# Patient Record
Sex: FEMALE | URBAN - METROPOLITAN AREA
[De-identification: names, ages, dates, MRNs, and addresses within clinical notes are randomized per-mention and may not be internally consistent; named-entity substitution may affect disease eponyms.]

---

## 2018-06-05 ENCOUNTER — OFFICE VISIT (OUTPATIENT)
Dept: URBAN - METROPOLITAN AREA CLINIC 58 | Facility: CLINIC | Age: 78
End: 2018-06-05
Payer: MEDICARE

## 2018-06-05 DIAGNOSIS — H01.114 ALLERGIC DERMATITIS OF LEFT UPPER EYELID: ICD-10-CM

## 2018-06-05 DIAGNOSIS — H01.111 ALLERGIC DERMATITIS OF RIGHT UPPER EYELID: Primary | ICD-10-CM

## 2018-06-05 DIAGNOSIS — H25.13 AGE-RELATED NUCLEAR CATARACT, BILATERAL: ICD-10-CM

## 2018-06-05 PROCEDURE — 99213 OFFICE O/P EST LOW 20 MIN: CPT | Performed by: OPTOMETRIST

## 2018-06-05 ASSESSMENT — INTRAOCULAR PRESSURE
OD: 19
OS: 19

## 2018-06-05 NOTE — IMPRESSION/PLAN
Impression: Age-related nuclear cataract, bilateral: H25.13. Plan: Diagnosis discussed in detail. Pt desires cataract surgery. Consultation with Maria Isabel Landa DO for eval/treatment.

## 2019-07-18 ENCOUNTER — OFFICE VISIT (OUTPATIENT)
Dept: URBAN - METROPOLITAN AREA CLINIC 58 | Facility: CLINIC | Age: 79
End: 2019-07-18
Payer: MEDICARE

## 2019-07-18 DIAGNOSIS — H35.371 PUCKERING OF MACULA, RIGHT EYE: ICD-10-CM

## 2019-07-18 DIAGNOSIS — H40.013 OPEN ANGLE WITH BORDERLINE FINDINGS, LOW RISK, BILATERAL: ICD-10-CM

## 2019-07-18 DIAGNOSIS — H52.4 PRESBYOPIA: ICD-10-CM

## 2019-07-18 DIAGNOSIS — E11.9 TYPE 2 DIABETES MELLITUS W/O COMPLICATION: Primary | ICD-10-CM

## 2019-07-18 PROCEDURE — 92014 COMPRE OPH EXAM EST PT 1/>: CPT | Performed by: OPTOMETRIST

## 2019-07-18 ASSESSMENT — VISUAL ACUITY
OD: 20/30
OS: 20/30

## 2019-07-18 ASSESSMENT — INTRAOCULAR PRESSURE
OD: 20
OS: 20

## 2019-07-18 ASSESSMENT — KERATOMETRY
OS: 45.50
OD: 46.00

## 2019-07-18 NOTE — IMPRESSION/PLAN
Impression: Type 2 diabetes mellitus w/o complication: J74.3. Plan: Diabetes type II: no background retinopathy, no signs of neovascularization noted. Discussed ocular and systemic benefits of blood sugar control.

## 2019-07-18 NOTE — IMPRESSION/PLAN
Impression: Age-related nuclear cataract, bilateral: H25.13. Plan: Discussed diagnosis in detail with patient. No treatment is required at this time. Will continue to observe condition and or symptoms. Call if [address] worsens.

## 2019-07-18 NOTE — IMPRESSION/PLAN
Impression: Puckering of macula, right eye: H35.371. Plan: Discussed diagnosis in detail with patient. No treatment is required at this time. Will continue to observe condition and or symptoms. Call if [place] worsens.

## 2019-08-06 ENCOUNTER — TESTING ONLY (OUTPATIENT)
Dept: URBAN - METROPOLITAN AREA CLINIC 58 | Facility: CLINIC | Age: 79
End: 2019-08-06

## 2019-08-06 PROCEDURE — V2799 MISC VISION ITEM OR SERVICE: HCPCS | Performed by: OPTOMETRIST

## 2019-08-06 ASSESSMENT — KERATOMETRY
OD: 46.00
OS: 45.63

## 2019-08-06 ASSESSMENT — VISUAL ACUITY
OS: 20/30
OD: 20/30

## 2019-08-06 NOTE — IMPRESSION/PLAN
Impression: Presbyopia: H52.4. Plan: Diagnosis discussed. Same prescription found today. Best corrected vision only 20/30. Attribute problems to cataracts. See previous note. Continue to monitor.

## 2019-09-23 ENCOUNTER — OFFICE VISIT (OUTPATIENT)
Dept: URBAN - METROPOLITAN AREA CLINIC 58 | Facility: CLINIC | Age: 79
End: 2019-09-23
Payer: MEDICARE

## 2019-09-23 PROCEDURE — 99204 OFFICE O/P NEW MOD 45 MIN: CPT | Performed by: OPHTHALMOLOGY

## 2019-09-23 RX ORDER — PREDNISOLONE ACETATE 10 MG/ML
1 % SUSPENSION/ DROPS OPHTHALMIC
Qty: 5 | Refills: 1 | Status: INACTIVE
Start: 2019-09-23 | End: 2019-11-06

## 2019-09-23 RX ORDER — OFLOXACIN 3 MG/ML
0.3 % SOLUTION/ DROPS OPHTHALMIC
Qty: 5 | Refills: 1 | Status: INACTIVE
Start: 2019-09-23 | End: 2019-12-04

## 2019-09-23 ASSESSMENT — VISUAL ACUITY
OS: 20/30
OD: 20/30

## 2019-09-23 ASSESSMENT — INTRAOCULAR PRESSURE
OD: 18
OS: 18

## 2019-09-23 ASSESSMENT — KERATOMETRY
OS: 45.88
OD: 46.00

## 2019-09-26 ENCOUNTER — OFFICE VISIT (OUTPATIENT)
Dept: URBAN - METROPOLITAN AREA CLINIC 58 | Facility: CLINIC | Age: 79
End: 2019-09-26
Payer: MEDICARE

## 2019-09-26 PROCEDURE — W9997 IOL SELECTION: HCPCS | Performed by: OPHTHALMOLOGY

## 2019-09-26 PROCEDURE — 92136 OPHTHALMIC BIOMETRY: CPT | Performed by: OPHTHALMOLOGY

## 2019-09-26 ASSESSMENT — PACHYMETRY
OS: 2.77
OD: 2.88
OS: 22.35
OD: 22.29

## 2019-10-10 ENCOUNTER — OFFICE VISIT (OUTPATIENT)
Dept: URBAN - METROPOLITAN AREA CLINIC 58 | Facility: CLINIC | Age: 79
End: 2019-10-10
Payer: MEDICARE

## 2019-10-10 DIAGNOSIS — H00.025 HORDEOLUM INTERNUM LEFT LOWER EYELID: Primary | ICD-10-CM

## 2019-10-10 PROCEDURE — 99213 OFFICE O/P EST LOW 20 MIN: CPT | Performed by: OPTOMETRIST

## 2019-10-10 RX ORDER — CEPHALEXIN 500 MG/1
500 MG CAPSULE ORAL
Qty: 14 | Refills: 0 | Status: INACTIVE
Start: 2019-10-10 | End: 2019-11-06

## 2019-10-10 ASSESSMENT — INTRAOCULAR PRESSURE
OS: 20
OD: 19

## 2019-10-10 NOTE — IMPRESSION/PLAN
Impression: Hordeolum internum left lower eyelid: H00.025. Plan: Discussed diagnosis in detail with patient. Continue hot compresses tid OS until gone. Pt anxious and wants to make sure it goes away. Start Keflex 500mg PO bid x 1 week. OK to proceed with cat sx as scheduled. Expressed successfully in office.

## 2019-10-22 ENCOUNTER — SURGERY (OUTPATIENT)
Dept: URBAN - METROPOLITAN AREA SURGERY 32 | Facility: SURGERY | Age: 79
End: 2019-10-22
Payer: MEDICARE

## 2019-10-22 PROCEDURE — 66984 XCAPSL CTRC RMVL W/O ECP: CPT | Performed by: OPHTHALMOLOGY

## 2019-10-23 ENCOUNTER — POST-OPERATIVE VISIT (OUTPATIENT)
Dept: URBAN - METROPOLITAN AREA CLINIC 58 | Facility: CLINIC | Age: 79
End: 2019-10-23

## 2019-10-23 PROCEDURE — 99024 POSTOP FOLLOW-UP VISIT: CPT | Performed by: OPTOMETRIST

## 2019-10-23 ASSESSMENT — INTRAOCULAR PRESSURE
OS: 20
OD: 16

## 2019-10-31 ENCOUNTER — POST-OPERATIVE VISIT (OUTPATIENT)
Dept: URBAN - METROPOLITAN AREA CLINIC 58 | Facility: CLINIC | Age: 79
End: 2019-10-31

## 2019-10-31 DIAGNOSIS — Z09 ENCNTR FOR F/U EXAM AFT TRTMT FOR COND OTH THAN MALIG NEOPLM: Primary | ICD-10-CM

## 2019-10-31 PROCEDURE — 99024 POSTOP FOLLOW-UP VISIT: CPT | Performed by: OPTOMETRIST

## 2019-10-31 ASSESSMENT — INTRAOCULAR PRESSURE
OD: 16
OS: 15

## 2019-11-05 ENCOUNTER — SURGERY (OUTPATIENT)
Dept: URBAN - METROPOLITAN AREA SURGERY 32 | Facility: SURGERY | Age: 79
End: 2019-11-05
Payer: MEDICARE

## 2019-11-05 PROCEDURE — 66984 XCAPSL CTRC RMVL W/O ECP: CPT | Performed by: OPHTHALMOLOGY

## 2019-11-06 ENCOUNTER — POST-OPERATIVE VISIT (OUTPATIENT)
Dept: URBAN - METROPOLITAN AREA CLINIC 58 | Facility: CLINIC | Age: 79
End: 2019-11-06

## 2019-11-06 PROCEDURE — 99024 POSTOP FOLLOW-UP VISIT: CPT | Performed by: OPTOMETRIST

## 2019-11-06 ASSESSMENT — INTRAOCULAR PRESSURE
OD: 18
OS: 17

## 2019-11-11 ENCOUNTER — POST-OPERATIVE VISIT (OUTPATIENT)
Dept: URBAN - METROPOLITAN AREA CLINIC 58 | Facility: CLINIC | Age: 79
End: 2019-11-11

## 2019-11-11 PROCEDURE — 99024 POSTOP FOLLOW-UP VISIT: CPT | Performed by: OPTOMETRIST

## 2019-11-11 ASSESSMENT — INTRAOCULAR PRESSURE
OS: 14
OD: 14

## 2019-12-04 ENCOUNTER — POST-OPERATIVE VISIT (OUTPATIENT)
Dept: URBAN - METROPOLITAN AREA CLINIC 58 | Facility: CLINIC | Age: 79
End: 2019-12-04
Payer: COMMERCIAL

## 2019-12-04 PROCEDURE — 99024 POSTOP FOLLOW-UP VISIT: CPT | Performed by: OPTOMETRIST

## 2019-12-04 ASSESSMENT — VISUAL ACUITY
OS: 20/20-1
OD: 20/20

## 2019-12-04 ASSESSMENT — KERATOMETRY
OS: 45.38
OD: 45.75

## 2019-12-04 ASSESSMENT — INTRAOCULAR PRESSURE
OS: 12
OD: 12

## 2020-04-23 ENCOUNTER — OFFICE VISIT (OUTPATIENT)
Dept: URBAN - METROPOLITAN AREA CLINIC 58 | Facility: CLINIC | Age: 80
End: 2020-04-23
Payer: MEDICARE

## 2020-04-23 DIAGNOSIS — H10.45 OTHER CHRONIC ALLERGIC CONJUNCTIVITIS: Primary | ICD-10-CM

## 2020-04-23 PROCEDURE — 92014 COMPRE OPH EXAM EST PT 1/>: CPT | Performed by: OPTOMETRIST

## 2020-04-23 RX ORDER — PREDNISOLONE ACETATE 10 MG/ML
1 % SUSPENSION/ DROPS OPHTHALMIC
Qty: 5 | Refills: 0 | Status: INACTIVE
Start: 2020-04-23 | End: 2020-06-18

## 2020-04-23 ASSESSMENT — INTRAOCULAR PRESSURE
OS: 14
OD: 14

## 2020-04-23 NOTE — IMPRESSION/PLAN
Impression: Other chronic allergic conjunctivitis: H10.45. Plan: Diagnosis explained in detail, start prednisolone ac. 1% 1gtt OU tid x 1 wk then bid x 1 wk then qd x 1 wk then d/c. Will continue to monitor the condition. Pt to call if condition worsens. Smear excess onto lids.

## 2020-06-18 ENCOUNTER — OFFICE VISIT (OUTPATIENT)
Dept: URBAN - METROPOLITAN AREA CLINIC 58 | Facility: CLINIC | Age: 80
End: 2020-06-18
Payer: MEDICARE

## 2020-06-18 PROCEDURE — 92012 INTRM OPH EXAM EST PATIENT: CPT | Performed by: OPTOMETRIST

## 2020-06-18 RX ORDER — DOXYCYCLINE HYCLATE 50 MG/1
50 MG CAPSULE, GELATIN COATED ORAL
Qty: 60 | Refills: 0 | Status: INACTIVE
Start: 2020-06-18 | End: 2020-08-19

## 2020-06-18 ASSESSMENT — INTRAOCULAR PRESSURE: OS: 11

## 2020-06-18 NOTE — IMPRESSION/PLAN
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 Plan: Diagnosis discussed, explained that symptoms are caused by MGD, Doxycyline 50mg po x 60 days.   doxy instructions and side effects discussed

## 2020-08-19 ENCOUNTER — OFFICE VISIT (OUTPATIENT)
Dept: URBAN - METROPOLITAN AREA CLINIC 58 | Facility: CLINIC | Age: 80
End: 2020-08-19
Payer: MEDICARE

## 2020-08-19 PROCEDURE — 92012 INTRM OPH EXAM EST PATIENT: CPT | Performed by: OPTOMETRIST

## 2020-08-19 RX ORDER — DOXYCYCLINE HYCLATE 20 MG/1
20 MG TABLET, FILM COATED ORAL
Qty: 90 | Refills: 0 | Status: INACTIVE
Start: 2020-08-19 | End: 2020-09-08

## 2020-08-19 ASSESSMENT — INTRAOCULAR PRESSURE
OD: 14
OS: 12

## 2020-08-19 NOTE — IMPRESSION/PLAN
Impression: Rosacea conjunctivitis, bilateral: I17.560. Plan: Diagnosis discussed, explained that symptoms are caused by MGD. Continue Doxycyline 20mg po x 90 days. doxy instructions and side effects discussed. Will continue to monitor. Call if worse.

## 2020-10-22 ENCOUNTER — OFFICE VISIT (OUTPATIENT)
Dept: URBAN - METROPOLITAN AREA CLINIC 58 | Facility: CLINIC | Age: 80
End: 2020-10-22
Payer: MEDICARE

## 2020-10-22 PROCEDURE — 92012 INTRM OPH EXAM EST PATIENT: CPT | Performed by: OPTOMETRIST

## 2020-10-22 RX ORDER — PREDNISOLONE ACETATE 10 MG/ML
1 % SUSPENSION/ DROPS OPHTHALMIC
Qty: 5 | Refills: 0 | Status: INACTIVE
Start: 2020-10-22 | End: 2020-11-13

## 2020-10-22 ASSESSMENT — INTRAOCULAR PRESSURE
OD: 18
OS: 15

## 2020-10-22 NOTE — IMPRESSION/PLAN
Impression: Rosacea conjunctivitis, bilateral: O84.602. Plan: Diagnosis discussed in detail. Start Pred 1 gtt OS QID x 2 days then BID x 1 week.

## 2020-10-30 ENCOUNTER — OFFICE VISIT (OUTPATIENT)
Dept: URBAN - METROPOLITAN AREA CLINIC 58 | Facility: CLINIC | Age: 80
End: 2020-10-30
Payer: MEDICARE

## 2020-10-30 PROCEDURE — 92012 INTRM OPH EXAM EST PATIENT: CPT | Performed by: OPTOMETRIST

## 2020-10-30 ASSESSMENT — INTRAOCULAR PRESSURE
OD: 16
OS: 16

## 2020-10-30 NOTE — IMPRESSION/PLAN
Impression: Rosacea conjunctivitis, bilateral: L45.121. Plan: Diagnosis discussed in detail. Pred 1gtt OU qd x 1-2 wks then d/c.

## 2020-11-19 ENCOUNTER — OFFICE VISIT (OUTPATIENT)
Dept: URBAN - METROPOLITAN AREA CLINIC 58 | Facility: CLINIC | Age: 80
End: 2020-11-19
Payer: COMMERCIAL

## 2020-11-19 DIAGNOSIS — H10.823 ROSACEA CONJUNCTIVITIS, BILATERAL: ICD-10-CM

## 2020-11-19 PROCEDURE — 92012 INTRM OPH EXAM EST PATIENT: CPT | Performed by: OPTOMETRIST

## 2020-11-19 RX ORDER — TRIAMCINOLONE ACETONIDE 5 MG/G
0.5 % CREAM TOPICAL
Qty: 1 | Refills: 1 | Status: ACTIVE
Start: 2020-11-19

## 2020-11-19 ASSESSMENT — VISUAL ACUITY
OS: 20/20
OD: 20/20

## 2020-11-19 ASSESSMENT — INTRAOCULAR PRESSURE
OD: 20
OS: 14

## 2020-11-19 NOTE — IMPRESSION/PLAN
Impression: Contact dermatitis of eyelid: H01.119. Plan: Discussed diagnosis in detail with patient. D/c hydrocortizone cream.  Start triamcinalone ac.   .5% tid to eyelids for 2 days then decrease to qd or bid dosing PRN

## 2020-11-19 NOTE — IMPRESSION/PLAN
Impression: Rosacea conjunctivitis, bilateral: N84.164. Plan: Continue doxy until Rx is out. Pt not sure if it helps, but she says it may a little. OK to call if she wants a refill.

## 2021-02-05 ENCOUNTER — OFFICE VISIT (OUTPATIENT)
Dept: URBAN - METROPOLITAN AREA CLINIC 58 | Facility: CLINIC | Age: 81
End: 2021-02-05
Payer: MEDICARE

## 2021-02-05 DIAGNOSIS — H01.119 CONTACT DERMATITIS OF EYELID: Primary | ICD-10-CM

## 2021-02-05 PROCEDURE — 99212 OFFICE O/P EST SF 10 MIN: CPT | Performed by: OPTOMETRIST

## 2021-02-05 ASSESSMENT — INTRAOCULAR PRESSURE
OS: 14
OD: 15

## 2021-02-05 NOTE — IMPRESSION/PLAN
Impression: Contact dermatitis of eyelid: H01.119. Plan: Discussed diagnosis in detail with patient. D/c hydrocortizone cream.  Start triamcinalone ac. .5%  PRN.   PT says resolved

## 2023-10-05 ENCOUNTER — OFFICE VISIT (OUTPATIENT)
Dept: URBAN - METROPOLITAN AREA CLINIC 58 | Facility: CLINIC | Age: 83
End: 2023-10-05
Payer: MEDICARE

## 2023-10-05 DIAGNOSIS — E11.9 TYPE 2 DIABETES MELLITUS W/O COMPLICATION: Primary | ICD-10-CM

## 2023-10-05 DIAGNOSIS — H40.013 OPEN ANGLE WITH BORDERLINE FINDINGS, LOW RISK, BILATERAL: ICD-10-CM

## 2023-10-05 PROCEDURE — 92014 COMPRE OPH EXAM EST PT 1/>: CPT | Performed by: OPTOMETRIST

## 2023-10-05 PROCEDURE — 92133 CPTRZD OPH DX IMG PST SGM ON: CPT | Performed by: OPTOMETRIST

## 2023-10-05 PROCEDURE — 76514 ECHO EXAM OF EYE THICKNESS: CPT | Performed by: OPTOMETRIST

## 2023-10-05 ASSESSMENT — INTRAOCULAR PRESSURE
OD: 20
OS: 20